# Patient Record
Sex: MALE | HISPANIC OR LATINO | Employment: PART TIME | URBAN - METROPOLITAN AREA
[De-identification: names, ages, dates, MRNs, and addresses within clinical notes are randomized per-mention and may not be internally consistent; named-entity substitution may affect disease eponyms.]

---

## 2019-01-07 ENCOUNTER — HOSPITAL ENCOUNTER (OUTPATIENT)
Dept: BEHAVIORAL HEALTH | Facility: CLINIC | Age: 34
Discharge: HOME | End: 2019-01-07
Attending: SOCIAL WORKER | Admitting: SOCIAL WORKER
Payer: COMMERCIAL

## 2019-01-07 VITALS
WEIGHT: 218 LBS | BODY MASS INDEX: 31.21 KG/M2 | SYSTOLIC BLOOD PRESSURE: 127 MMHG | HEIGHT: 70 IN | DIASTOLIC BLOOD PRESSURE: 77 MMHG | HEART RATE: 79 BPM

## 2019-01-07 PROCEDURE — H0001 ALCOHOL AND/OR DRUG ASSESS: HCPCS

## 2019-01-07 ASSESSMENT — ANXIETY QUESTIONNAIRES
2. NOT BEING ABLE TO STOP OR CONTROL WORRYING: NOT AT ALL
3. WORRYING TOO MUCH ABOUT DIFFERENT THINGS: NOT AT ALL
1. FEELING NERVOUS, ANXIOUS, OR ON EDGE: SEVERAL DAYS
GAD7 TOTAL SCORE: 1
6. BECOMING EASILY ANNOYED OR IRRITABLE: NOT AT ALL
7. FEELING AFRAID AS IF SOMETHING AWFUL MIGHT HAPPEN: NOT AT ALL
4. TROUBLE RELAXING: NOT AT ALL
5. BEING SO RESTLESS THAT IT IS HARD TO SIT STILL: NOT AT ALL

## 2019-01-07 ASSESSMENT — MIFFLIN-ST. JEOR: SCORE: 1945.09

## 2019-01-07 ASSESSMENT — PAIN SCALES - GENERAL: PAINLEVEL: NO PAIN (0)

## 2019-01-07 ASSESSMENT — PATIENT HEALTH QUESTIONNAIRE - PHQ9: SUM OF ALL RESPONSES TO PHQ QUESTIONS 1-9: 0

## 2019-01-07 NOTE — PROGRESS NOTES
"[facility]  [address] [place] [id number]      ADULT CD ASSESSMENT ADDENDUM      Patient Name: Real Manning  Cell Phone: [phone]   Email:  [email or web address]  Emergency Contact: Cintia Waters  (Mom) Tel: [phone]    The patient reported being:  Single, no serious involvement    With which race do you identify? / Black    Initial Screening Questions     1. Are you currently having severe withdrawal symptoms that are putting yourself or others in danger?  No    2. Are you currently having severe medical problems that require immediate attention?  No    3. Are you currently having severe emotional or behavioral problems that are putting yourself or others at risk of harm?  No    4. Do you have sufficient reading skills that will enable you to understand written materials, including the program rules and client rights materials?  Yes     Family History and other additional information     Who raised you? (parents, grandparents, adoptive parents, step-parents, etc.)    Great Aunt    Please tell me what it was like growing up in your family. (please include any history of substance abuse, mental health issues, emotional/physical/sexual abuse, forms of discipline, and support)       \"I had a pretty good childhood. I wasn't with my immediate family. Both my parents had their rights revoked. I was with my great aunt for 13 years.\" He reports his brother was with him as well.     CD: none  MH: none    Do you have any children or Stepchildren? Yes, explain: 2 children    Are you being investigated by Child Protection Services? No    Do you have a child protection worker, probation office or ?  Yes, explain: Mehrdad Grewal at [facility]    How would you describe your current finances?  Some money problems    If you are having problems, (unpaid bills, bankruptcy, IRS problems) please explain:  Yes, explain: student loans    If working or a student are you " "able to function appropriately in that setting? Yes     Describe your preferred learning style:  by hands-on practice and by watching someone else demonstrate    What are your some of your personal strengths?  \"I believe self discipline. My social skills are really strong. I catch onto things quickly. Helping others.\"    Do you currently participate in community enoc activities, such as attending Yazidism, temple, Christianity or Muslim services?  Yes, explain: He just joined a Yazidism.    How does your spirituality impact your recovery?  \"I feel like it brings me closer to success to getting to where I want to be at. It is really influential.\"    Do you currently self-administer your medications?  The patient is not currently taking any prescription or over the counter medications.    Have you ever had to lie to people important to you about how much you ewing?   No   Have you ever felt the need to bet more and more money?   Yes, explain: \"I would keep trying to get myself out of a hole. This happens once every few months.\"    Any big wins? \"a couple of times.\"  Min amount taken to the casino: $100-$200    He answered No to the Brief Biosocial Gambling Screen questions.     Have you ever attempted treatment for a gambling problem?   No   Have you ever touched or fondled someone else inappropriately or forced them to have sex with you against their will?   No   Are you or have you ever been a registered sex offender?   No   Is there any history of sexual abuse in your family? No   Have you ever felt obsessed by your sexual behavior, such as having sex with many partners, masturbating often, using pornography often?   No     Have you ever received therapy or stayed in the hospital for mental health problems?   Yes, explain: therapy when younger. No hospitalizations.     Have you ever hurt yourself, such as cutting, burning or hitting yourself?   No     Have you ever purged, binged or restricted yourself as a way to " control your weight?   No     Are you on a special diet?   No     Do you have any concerns regarding your nutritional status?   No     Have you had any appetite changes in the last 3 months?   No   Have you had weight loss or weight gain of more than 10 lbs in the last 3 months?   If patient gained or lost more than 10 lbs, then refer to program RN / attending Physician for assessment.   Yes, explain: He gained weight.   Was the patient informed of BMI?    Above,  General nutrition education   Yes   Have you engaged in any risk-taking behavior that would put you at risk for exposure to blood-borne or sexually transmitted diseases?   No   Do you have any dental problems?   No   Have you ever lived through any trauma or stressful life events?   No   In the past month, have you had any of the following symptoms related to the trauma listed above? (dreams, intense memories, flashbacks, physical reactions, etc.)   No   Have you ever believed people were spying on you, or that someone was plotting against you or trying to hurt you?   No   Have you ever believed someone was reading your mind or could hear your thoughts or that you could actually read someone's mind or hear what another person was thinking?   No   Have you ever believed that someone of some force outside of yourself was putting thoughts into your mind or made you act in a way that was not your usual self?  Have you ever though you were possessed?   No   Have you ever believed you were being sent special messages through the TV, radio or newspaper?   No   Have you ever heard things other people couldn't hear, such as voices or other noises?   No   Have you ever had visions when you were awake?  Or have you ever seen things other people couldn't see?   No   Do you have a valid 's license?    No, explain: He has never had a license before.     PHQ-9, ERIC-7 and Suicide Risk Assessment   PHQ-9 on 1/7/2019 ERIC-7 on 1/7/2019   The patient's PHQ-9 score was 0  out of 27, indicating no depression.   The patient's ERIC-7 score was 1 out of 21, indicating minimal anxiety.       Sargent-Suicide Severity Rating Scale   Suicide Ideation   1.) Have you ever wished you were dead or that you could go to sleep and not wake up?     Lifetime:  No   Past Month:  No     2.) Have you actually had any thoughts of killing yourself?   Lifetime:  Yes   Past Month:  No     3.) Have you been thinking about how you might do this?     Lifetime:  Yes, Describe: 2017.    Past Month:  No     4.) Have you had these thoughts and had some intention of acting on them?     Lifetime:  No   Past Month:  No     5.) Have you started to work out the details of how to kill yourself?   Lifetime:  No   Past Month:  No     6.) Do you intend to carry out this plan?      Lifetime:  No   Past Month:  No     Intensity of Ideation   Intensity of ideation (1 being least severe, 5 being most severe):     Lifetime:  1   Past Month:  The patient denied having any suicidal thoughts within the past month.     How often do you have these thoughts?  The patient denied having any suicidal thoughts within the past month.     When you have the thoughts how long do they last?  The patient denied having any suicidal thoughts within the past month.     Can you stop thinking about killing yourself or wanting to die if you want to?  The patient denied having any suicidal thoughts within the past month.     Are there things - anyone or anything (i.e. family, Orthodox, pain of death) that stopped you from wanting to die or acting on thoughts of suicide?  Protective factors definitely stopped you from attempting suicide     What sort of reasons did you have for thinking about wanting to die or killing yourself (ie end pain, stop how you were feeling, get attention or reaction, revenge)?  Does not apply     Suicidal Behavior   (Suicide Attempt) - Have you made a suicide attempt?     Lifetime:  The patient had never made a suicide  attempt.   Past Month:  The patient had never made a suicide attempt.     Have you engaged in self-harm (non-suicidal self-injury)?  The patient denied having any history of engaging in self-harm (non-suicidal self-injury).     (Interrupted Attempt) - Has there been a time when you started to do something to end your life but someone or something stopped you before you actually did anything?  No     (Aborted or Self-Interrupted Attempt) - Has there been a time when you started to do something to try to end your life but you stopped yourself before you actually did anything?  No     (Preparatory Acts of Behavior) - Have you taken any steps towards making suicide attempt or preparing to kill yourself (such as collecting pills, getting a gun, giving valuables away or writing a suicide note)?  No     Actual Lethality/Medical Damage:  The patient denied ever making a suicidal attempt.       2008  [employer] [facility]  Used with permission by Rosita Jung, PhD.       Guide to C-SSRS Risk Ratings   NO IDEATION:  with no active thoughts IDEATION: with a wish to die. IDEATION: with active thoughts. Risk Ratings   If Yes No No 0 - Very Low Risk   If NA Yes No 1 - Low Risk   If NA Yes Yes 2 - Low/moderate risk   IDEATION: associated thoughts of methods without intent or plan INTENT: Intent to follow through on suicide PLAN: Plan to follow through on suicide Risk Ratings cont...   If Yes No No 3 - Moderate Risk   If Yes Yes No 4 - High Risk   If Yes Yes Yes 5 - High Risk   The patient's ADDITIONAL RISK FACTORS and lack of PROTECTIVE FACTORS may increase their overall suicide risk ratings.     Additional Risk Factors:    Significant legal problems including being at risk of incarceration   Protective Factors:    Having people in his/her life that would prevent the patient from considering a suicide attempt (i.e. young children, spouse, parents, etc.)     An absence of mental health issues or stable  "and well treated mental health issues     An absence of chronic health problems or stable and well treated chronic health issues     Having easy access to supportive family members     Having a good community support network     Having cultural, Restorationist or spiritual beliefs that discourage suicide     Having restricted access to highly lethal means of suicide     Risk Status   Past month:0. - Very Low Risk:  Evaluation Counselors:  Document in Epic / SBAR to counselor \"Very Low Risk\".      Treatment Counselors:  Reassess upon admission as applicable, assess weekly in progress notes under Dimension 3 and summarize in Discharge / Treatment summary under Dimension 3.    Past 24 hours:0. - Very Low Risk:  Evaluation Counselors:  Document in Epic / SBAR to counselor \"Very Low Risk\".      Treatment Counselors:  Reassess upon admission as applicable, assess weekly in progress notes under Dimension 3 and summarize in Discharge / Treatment summary under Dimension 3.   Additional information to support suicide risk rating: There was no additional information to provide at this time.  Please see the above suicide risk rating information.     Mental Health Status   Physical Appearance/Attire: Appears stated age   Hygiene: well groomed   Eye Contact: at examiner   Speech Rate:  regular   Speech Volume: regular   Speech Quality: fluid   Cognitive/Perceptual:  reality based   Cognition: memory intact    Judgment: intact   Insight: intact   Orientation:  time, place, person and situation   Thought: logical    Hallucinations:  none   General Behavioral Tone: cooperative   Psychomotor Activity: no problem noted   Gait:  no problem   Mood: normal and appropriate   Affect: congruence/appropriate   Counselor Notes: NA     Criteria for Diagnosis: DSM-5 Criteria for Substance Use Disorders      Alcohol Use Disorder Moderate - 303.90 (F10.20)    Level of Care   I.) Intoxication and Withdrawal: 0   II.) Biomedical:  0   III.) Emotional " and Behavioral:  1   IV.) Readiness to Change:  2   V.) Relapse Potential: 2   VI.) Recovery Environmental: 2     Initial Problem List     The patient lacks relapse prevention skills  The patient has poor coping skills  The patient has poor refusal skills   The patient lacks a sober peer support network  The patient has a tendency to isolate  The patient has current legal issues    Patient/Client is willing to follow treatment recommendations.  Yes    Counselor: Talita Finn [facility]      Vulnerable Adult Checklist for OUTPATIENTS     1.  Do you have a physical, emotional or mental infirmity or dysfunction?       No    2.  Does this issue impair your ability to provide for your own care without help, including providing yourself with food, shelter, clothing, healthcare or supervision?       No    3.  Because of this issue, I need assistance to protect myself from maltreatment by others.      No    Based on the above information:    This person is not a functional Vulnerable Adult according to [place] Statute 626.5572 subdivision 21.

## 2019-01-07 NOTE — PROGRESS NOTES
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 prone he or she is to relapse? What coping skills does the person have that decrease relapse potential?)      Pt denies having ever attended a cd treatment program, a detox program, or a 12 step meeting before. Pt has never tried to stop drinking until May or 2018. Since then he reports drinking about once a month, but is unable to completely abstain from alcohol and non-prescribed opiates. In the past 30 days, pt rates his cravings for alcohol as a 0 on the 0-10 Craving Scale.  Pt has poor impulse control when it comes to not drinking.  Pt displays moderate vulnerability for further substance use.         DIMENSION VI - Recovery Environment   1. Are you employed/attending school? Tell me about that.     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 He reports he takes care of his 6 year old daughter full time. 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    2A. Describe a typical day; evening for you. Work, school, social, leisure, volunteer, spiritual practices. Include time spent obtaining, using, recovering from drugs or alcohol. (DSM)     Sober day: \"wake up taking my daughter to school, going to work. 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    Please describe what leisure activities have been associated with your substance abuse:     \"at a bar, cause some of the places have a bar.\" He would order juice or water if he was performing at the place.     2B. How often do you spend more time than you planned using or use more than you planned? (DSM)     \"very minimal now.\"  He was more likely to drink more than planned prior to probation.    3. How important is using to your social connections? Do many of your family or friends use?     He doesn't hang out with a lot of people.    4A. Are you currently in a significant relationship?     No    4C. Sexual Orientation:     Heterosexual    5A. Who do you live with?      His mom and 6 year old daughter.    5B. Tell me about their alcohol/drug use and mental health issues.     No use    5C. Are you concerned for your safety there? No    5D. Are you concerned about the safety of anyone else who lives with you? No    6A. Do you have children who live with you?     Yes, 6 year old daughter. No CPS involvement.     6B. Do you have children who do not live with you?     Yes, a  baby    7A. Who supports you in making changes in your alcohol or drug use? What are they willing to do to support you? Who is upset or angry about you making changes in your alcohol or drug use? How big a problem is this for you?      \"everyone.  My mentor is very supportive of me. He would even come visit me in the workhouse.\" They see each other weekly.  \"He is the member of the [employer] I joined.\"    7B. This table is provided to record information about the person s relationships and available support It is not necessary to ask each item; only to get a comprehensive picture of their support system.  How often can you count on the following people when you need someone?   Partner / Spouse The patient does not " have a current partner or spouse.   Parent(s)/Aunt(s)/Uncle(s)/Grandparents Always supportive   Sibling(s)/Cousin(s) Usually supportive   Child(mary) Always supportive   Other relative(s) Always supportive   Friend(s)/neighbor(s) Always supportive   Child(mary) s father(s)/mother(s) Always supportive   Support group member(s) The patient denied having any current involvement with 12-step or other support group meetings.   Community of enoc members Always supportive   /counselor/therapist/healer The patient denied having any current involvement with a , counselor, therapist or healer.   Other (specify) No     8A. What is your current living situation?     He currently lives with his mom and daughter.    8B. What is your long term plan for where you will be living?    He eventually wants his own place.    8C. Tell me about your living environment/neighborhood? Ask enough follow up questions to determine safety, criminal activity, availability of alcohol and drugs, supportive or antagonistic to the person making changes.      No concerns.    9. Criminal justice history: Gather current/recent history and any significant history related to substance use--Arrests? Convictions? Circumstances? Alcohol or drug involvement? Sentences? Still on probation or parole? Expectations of the court? Current court order? Any sex offenses - lifetime? What level? (DSM)    Felony assault charge from April/May 2018  [facility]ation in [facility]  Collateral reports: pt has a DWI in 2009 and a DWI in 2012.  In about 2015, he has a prior domestic and a violation of a DANCO.    10. What obstacles exist to participating in treatment? (Time off work, childcare, funding, transportation, pending intermediate time, living situation)     The patient denied having any obstacles for participating in substance abuse treatment.    Dimension VI Ratings   Recovery environment - The placing authority must use the criteria in Dimension VI  to determine a client s recovery environment.   RISK DESCRIPTIONS - Severity ratin Client is engaged in structured, meaningful activity, but peers, family, significant other, and living environment are unsupportive, or there is criminal justice involvement by the client or among the client's peers, significant others, or in the client's living environment.    REASONS SEVERITY WAS ASSIGNED - (What support does the person have for making changes? What structure/stability does the person have in his or her daily life that will increase the likelihood that changes can be sustained? What problems exist in the person s environment that will jeopardize getting/staying clean and sober?)     The patient currently lives with his mother and his 6 year old daughter.  The patient denied having any concerns regarding his immediate living environment or neighborhood.  The patient denied having a relationship conflict with anyone due to his ongoing substance abuse issues. The patient identified as being heterosexual and he denied being in a romantic relationship at this time. The patient reported having a history of legal issues, including a felony assault from 2018. He is on probation in [facility]. The patient is unemployed and he last worked  when his contract ended. As a result, the patient reported having some increased financial stress.  The patient reports most people he is around are sober.         Client Choice/Exceptions   Would you like services specific to language, age, gender, culture, Baptist preference, race, ethnicity, sexual orientation or disability?  No    What particular treatment choices and options would you like to have? None    Do you have a preference for a particular treatment program? None    Criteria for Diagnosis     Criteria for Diagnosis  DSM-5 Criteria for Substance Use Disorder  Instructions: Determine whether the client currently meets the criteria for Substance Use Disorder  using the diagnostic criteria in the DSM-V pp.481-589. Current means during the most recent 12 months outside a facility that controls access to substances    Category of Substance Severity (ICD-10 Code / DSM 5 Code)     Alcohol Use Disorder Moderate  (F10.20) (303.90)   Cannabis Use Disorder The patient does not meet the criteria for a Cannabis use disorder.   Hallucinogen Use Disorder The patient does not meet the criteria for a Hallucinogen use disorder.   Inhalant Use Disorder The patient does not meet the criteria for an Inhalant use disorder.   Opioid Use Disorder The patient does not meet the criteria for an Opioid use disorder.   Sedative, Hypnotic, or Anxiolytic Use Disorder The patient does not meet the criteria for a Sedative/Hypnotic use disorder.   Stimulant Related Disorder The patient does not meet the criteria for a Stimulant use disorder.   Tobacco Use Disorder The patient does not meet the criteria for a Tobacco use disorder.   Other (or unknown) Substance Use Disorder The patient does not meet the criteria for a Other (or unknown) Substance use disorder.       Collateral Contact Summary   Number of contacts made: 3    Contact with referring person:  No    If court related records were reviewed, summarize here: No court records had been reviewed at the time of this documentation.    Information from collateral contacts supported/largely agreed with information from the client and associated risk ratings.      Rule 25 Assessment Summary and Plan   's Recommendation    1)  Complete an Intensive Outpatient CD Treatment Program, such as [facility], [place].  2)  Abstain from all mood-altering chemicals unless prescribed by a licensed provider.   3)  Attend, at minimum, 2 weekly support group meetings, such as 12 step based (AA/NA), SMART Recovery, Celebrate Recovery, Health Realizations, and/or Refuge Recovery meetings.     4)  Actively work with your male mentor on a weekly basis.    5)  Follow all the recommendations of your treatment/medical providers.  6)  Remain law abiding and follow all recommendations of the Courts/PO.      Collateral Contacts     Name:    Mehrdad Grewal   Relationship:    Thomas العراقي PO   Phone Number:    [phone] fax Releases:    Yes     Barron stated he has been working with pt for a few months. Pt's felony assault occurred in April/May 2018 and he was convicted on 8/2/2018. Pt did serve some time in the workhouse. According to the [employer], pt began using substances between 15 and 17 years old. He used marijuana twice a month until 2017. He has 2 DWIs from 2009 and 2012 as well as a prior domestic assault charge and violating a DANCO around 2015. Pt had 3 positive UAs in November; 1 for alcohol, 1 for opiates, and one was diluted. His 2 UAs in December were negative and his January 3rd UA was negative. Barron reports pt is not willing to do CD tx, but he is willing to do individual psychotherapy. Barron wants pt to address any CD concerns before pt does psychotherapy. When it comes to gambling, the [employer] documented pt stating he gambles once a year. Most recently in 2018 when he won $440 playing black jazmin. Pt plays scratch offs twice a year.      Collateral Contacts     Name:    Cintia Waters   Relationship:    Mom   Phone Number:    [phone]   Releases:    Yes     Cintia stated she is not concerned about pt's chemical use. She stated he has used alcohol and weed, but not in some time. She reports no other drug use. She reports pt is the only one of her children that helps her and takes care of his kids. She confirms he lives with her. She stated he tries to take care of too many things at once.       Collateral Contacts     Name:    [facility]   Relationship:    EMR   Phone Number:    NA   Releases:    NA     The patient's medical record at [facility] was reviewed.  There were no recent prescriptions for opiate pain medications or ED visits recorded by  [facility], [facility], or [facility] in the past year.     ollateral Contacts      A problematic pattern of alcohol/drug use leading to clinically significant impairment or distress, as manifested by at least two of the following, occurring within a 12-month period:    2.) There is a persistent desire or unsuccessful efforts to cut down or control alcohol/drug use  5.) Recurrent alcohol/drug use resulting in a failure to fulfill major role obligations at work, school or home.  6.) Continued alcohol use despite having persistent or recurrent social or interpersonal problems caused or exacerbated by the effects of alcohol/drug.  11.) Withdrawal, as manifested by either of the following: The characteristic withdrawal syndrome for alcohol/drug (refer to Criteria A and B of the criteria set for alcohol/drug withdrawal).      Specify if: In early remission:  After full criteria for alcohol/drug use disorder were previously met, none of the criteria for alcohol/drug use disorder have been met for at least 3 months but for less than 12 months (with the exception that Criterion A4,  Craving or a strong desire or urge to use alcohol/drug  may be met).     In sustained remission:   After full criteria for alcohol use disorder were previously met, none of the criteria for alcohol/drug use disorder have been met at any time during a period of 12 months or longer (with the exception that Criterion A4,  Craving or strong desire or urge to use alcohol/drug  may be met).   Specify if:   This additional specifier is used if the individual is in an environment where access to alcohol is restricted.    Mild: Presence of 2-3 symptoms  Moderate: Presence of 4-5 symptoms  Severe: Presence of 6 or more symptoms

## 2019-01-09 ASSESSMENT — ANXIETY QUESTIONNAIRES: GAD7 TOTAL SCORE: 1

## 2019-01-11 NOTE — PROGRESS NOTES
[facility]  [address] [place] [address]., [place] [address]      1/11/2019      Real Manning  [address]  [place] [address]      Dear Mr. Manning,    It was a pleasure meeting with you on 1/7/2019 for your Chemical Dependency Evaluation. Based on your evaluation, the recommendation is:  1)  Complete an Intensive Outpatient CD Treatment Program, such as [facility], [place].  2)  Abstain from all mood-altering chemicals unless prescribed by a licensed provider.   3)  Attend, at minimum, 2 weekly support group meetings, such as 12 step based (AA/NA), SMART Recovery, Celebrate Recovery, Health Realizations, and/or Refuge Recovery meetings.     4)  Actively work with your male mentor on a weekly basis.   5)  Follow all the recommendations of your treatment/medical providers.  6)  Remain law abiding and follow all recommendations of the Courts/PO.    If I can be of further service, please don't hesitate to call.    Sincerely,        Talita Clark MA [facility]  CD Evaluation Counselor  [phone]    (emailed to pt on 1/11/2019)

## 2019-01-11 NOTE — PROGRESS NOTES
"Visit Date:   01/07/2019      CHEMICAL DEPENDENCY ASSESSMENT      DATE OF EVALUATION:  01/07/2019   EVALUATION COUNSELOR:  Talita Clark MA, YUDELKA.   PATIENT'S ADDRESS:   [address], [address], [place] [address]   TELEPHONE:  [phone]   STATISTICS:  Date of Birth 1985.  Age 33.  Gender: Male.  Marital Status:  Single.   [facility] [place]   REFERRAL SOURCE:  [facility].      REASON FOR EVALUATION:  Mr. Real Manning presented to [facility] for an outpatient evaluation for possible chemical dependency.  The reason for the CD evaluation was due to the patient \"I had 3 different positive UAs.  According to the orders by the  if I have reoccurring positives they want me to follow up with a chemical dependency evaluation.     1 (UA) I drink too much water.  Positive UA for dilution, 1 for opiates.  I smashed my finger real bad and the other one came in, positive for alcohol after the holiday.\"  He has no use condition for probation.      HEALTH HISTORY AND MEDICATIONS:  Patient denies.      HISTORY OF PREVIOUS TREATMENT AND COUNSELING:  The patient denies.      HISTORY OF ALCOHOL AND DRUG USE:    ALCOHOL:  Age of first use 17, first use was late high school chata year, he was drinking on the weekends.  Heaviest use was during high school years and then another heavy use period in 2010 when he was drinking every other day because \"the person I was with always had something in the refrigerator.\"  He would drink anything wine, beer, hard alcohol, etc.  Prior to 2018 he would drink a half pint or couple airplane bottles and a couple of beers in a day.  In 2018.  He reports drinking once a month or less, typically 1-2 drinks.  In the past 3 days he reports he drank twice, typically 1 drink.  He reports he does not buy alcohol if it is not around.  He drinks if he goes to someone's house.  Last use a couple nights ago.        MARIJUANA:  Age of " first use 17, heaviest use off and on until a few years ago.  In 2018  he reports he used once before probation.  Last used 06/18/2018.        OTHER OPIATES, SYNTHETICS:  Percocet.  He was first prescribed when he went to the ED for his finger and got a prescription for his finger a few weeks ago.  He denies abuse.  The patient appears either unable to or unwilling to provide an accurate opiate use history.  The patient's UA was positive for OXY today and he denies using in the past couple of days last week.  According to patient, 2 weeks ago.      SUMMARY OF CHEMICAL DEPENDENCY SYMPTOMS ACKNOWLEDGED BY THE PATIENT:  The patient identifies with 11 of 11 DSM-V criteria for a diagnostic impression of substance use disorder.      SUMMARY OF COLLATERAL DATA:   1.  The patient's , Mehrdad Morales  Barron states he has been working with the patient for a few months.  The patient's felony assault occurred in April or 05/2018 and he was convicted on 08/02/2018.  The patient did serve some time in the workhouse.  According to PSI the patient began using substances between 15 and 17 years old.  He used marijuana twice until 2017.  He has 2 DWIs from 2009 and 2012, as well as a prior domestic assault charge and violation of that information, around 2015, the patient had 3 positive UAs in November, 1 for alcohol, 1 for opiates and 1 was diluted.  His 2 UAs in December were negative and his 01/03 UA was negative.  Barron reports the patient is not willing to do CD treatment, but he is willing to do individual psychotherapy.  Barron wants the patient to address any CD concerns before patient does psychotherapy.  When it comes to family, the patient's PSI documented the patient's family stated he gambles once a year, most recently in 2018 when he won $440 buying [email or web address].  The patient plays scratch offs twice a year.      2.  The patient's mom, Cintia Jt.  Cintia states she is not concerned about the patient's  chemical use.  She stated he has used alcohol and weed, but not in some time.  She reports no other drug use.  She reports the patient is the only one of her children that helps her and takes care of his kids.  She confirms he lives with her.  She states he tries to take care of too many things at once.   3.  [facility] records.  The patient's medical records at [facility] was reviewed.  There were no recent prescriptions for opiate pain medications or ED visits recorded by [facility] or [facility] in the past year.      VULNERABLE ADULT ASSESSMENT:  This purse is not a functional vulnerable adult according to mental statured 626.557, subdivision 21.      IMPRESSION:  Alcohol use disorder, moderate, F10.20.      ASAM PLACEMENT CRITERIA:   DIMENSION 1:  Acute Intoxication and Withdrawal Potential:  Risk level 0.  The patient reports his last use of opiates was a couple weeks ago and his last use of alcohol was a couple days ago.  The patient displays no intoxication or withdrawal symptomatology at this time.  The patient denies any feelings of withdrawal.  The patient was given a breathalyzer during his evaluation. The patient's blood alcohol content was 0.  He was also given a UA during the evaluation.  UA was positive for opiate substances tested.  The patient denies using opiates in the past couple of days.      DIMENSION 2:  Biomedical Conditions and Complications:  Risk level 0.  The patient denies having any chronic biomedical conditions that interfere of treatment or any other recovery skills training workshop.  The patient reports having flat feet and allergies when the weather changes and to dust.  He denies taking any medications.  The patient does not have a primary care doctor.  At the time of this evaluation, the patient's blood pressure is 127/77 and his pulse was 79 beats per minute.  The patient denies any pain at this time.  Reports his pain level to be a 0  on the 0-10 pain rating scale.  The patient denies having any consumption of nicotine products at this time.        DIMENSION 3/EMOTIONAL, BEHAVIORAL CONDITIONS:  Risk level 1.  The patient reports having historical mental diagnosed ADHD as a child.  The patient is not taking medications at this time.  He denies a history of abuse.  At the time of the assessment, the patient's PHQ-9 score was 0, no depression.  His ERIC-7 score was 1, minimal anxiety.  The patient appears to have some emotional and stress management skills.  The patient denies any self-injury behavior, suicide attempts or suicidal thoughts at this time.  During the patient's assessment his Sully Suicide Severity rating Scale Score is 0, very low risk.        DIMENSION 4:  Readiness for Change:  Risk level 2.  The patient does not believe he really has a problem with alcohol.  He reports he has been able to stop smoking cigarettes and marijuana cold turkey and believes he can do the same with alcohol.  He has reduced his alcohol consumption to about once a month since May or June 2018.  During this evaluation, the patient was given a UA and the UA was prepped tested positive for oxy.  The patient denies using any opiates in the past couple of days.  He appears to be either unable or unwilling to provide an accurate use history when it comes to using opiate pain medications.  The patient appears to be in the preparation stage when it comes within the stage of change model and comes in using alcohol, marijuana, but in the precontemplation stage comes to his opiate use.      DIMENSION 5:  Relapse and Continued Use Potential:  Risk level 2.  The patient denies having ever attended CD treatment program, detoxification program or a 12-step meeting before.  He has never tried to stop drinking until May or 06/2018.  Since then, he reports drinking about once a month, but is unable to completely abstain from alcohol and non-prescribed opiates.  In the past  3 days, the patient rates his craving for alcohol as a 0 on 0-10 rating scale.  The patient has poor impulse control when it comes to not drinking.  He displays moderate vulnerability for further substance use.      DIMENSION 6:  Recovery Environment:  Risk level 2.  The patient currently lives with his mother and a 6-year-old daughter.  The patient denied having any concerns regarding his immediate living environment or neighborhood.  The patient denied having a relationship conflict with anyone due to his ongoing substance abuse issues.  The patient identifies as being heterosexual and denied being in a romantic relationship at this time.  The patient reported having history of legal issues including a felony assault from 2018.  He is on probation and has been fine.  The patient is unemployed.  He last worked until 12/29 when his contract ended.  As a result, the patient reported having some increased financial stress.  The patient reports most of the people he is around are sober.      RECOMMENDATIONS:   1.  Complete intensive outpatient CD treatment program such as [facility].     2.  Abstain from all mood-altering chemicals unless prescribed by licensed provider.   3.  Attend at minimum 2 weekly support group meetings such as 12-step base.     4.  Actively work with your male mentor on a weekly basis.   5.  Follow recommendations of your treatment and medical providers.   6.  Remain law abiding, follow recommendations of courts and probation.      INITIAL PROBLEM LIST:   1.  The patient lacked relapse prevention skills.   2.  The patient has poor coping skills.   3.  The patient has poor refusal skills.   4.  The patient lacks a sober peer support network.         This information has been disclosed to you from records protected by Federal confidentiality rules (42 CFR part 2). The Federal rules prohibit you from making any further disclosure of this information unless further disclosure is  expressly permitted by the written consent of the person to whom it pertains or as otherwise permitted by 42 CFR part 2. A general authorization for the release of medical or other information is NOT sufficient for this purpose. The Federal rules restrict any use of the information to criminally investigate or prosecute any alcohol or drug abuse patient.      TIFF JAY CD             D: 2019   T: 2019   MT: ROBERTO      Name:     MAGUE FIERRO   MRN:      [id number]        Account:      [id number]   :      1985           Visit Date:   2019      Document: [id number]

## 2024-01-30 ENCOUNTER — TELEPHONE (OUTPATIENT)
Dept: FAMILY MEDICINE | Facility: CLINIC | Age: 39
End: 2024-01-30

## 2024-01-30 ENCOUNTER — OFFICE VISIT (OUTPATIENT)
Dept: FAMILY MEDICINE | Facility: CLINIC | Age: 39
End: 2024-01-30
Payer: COMMERCIAL

## 2024-01-30 VITALS
RESPIRATION RATE: 22 BRPM | HEART RATE: 72 BPM | HEIGHT: 69 IN | SYSTOLIC BLOOD PRESSURE: 129 MMHG | WEIGHT: 217 LBS | TEMPERATURE: 97.5 F | OXYGEN SATURATION: 98 % | BODY MASS INDEX: 32.14 KG/M2 | DIASTOLIC BLOOD PRESSURE: 82 MMHG

## 2024-01-30 DIAGNOSIS — S66.921S: Primary | ICD-10-CM

## 2024-01-30 DIAGNOSIS — S61.411S: Primary | ICD-10-CM

## 2024-01-30 DIAGNOSIS — G89.29 CHRONIC PAIN OF RIGHT HAND: ICD-10-CM

## 2024-01-30 DIAGNOSIS — M79.641 CHRONIC PAIN OF RIGHT HAND: ICD-10-CM

## 2024-01-30 PROCEDURE — 99213 OFFICE O/P EST LOW 20 MIN: CPT | Mod: GC

## 2024-01-30 NOTE — PROGRESS NOTES
"  Assessment & Plan     Hand laceration involving tendon, right, sequela  Chronic pain of right hand  Patient is status postop right hand surgery.  Date of surgery 11/23/2023.  Patient is status post right wrist laceration repair of numerous flexor tendons, median nerve, and ulnar artery.  Patient has reported chronic pain since the surgery and has been utilizing Neurontin as well as oxycodone for acute pain.  At patient's last visit with Dr. Christian cosmetic/plastic surgeon, patient was provided with 1 last refill of oxycodone 5 mg tablets dispense 15.  Patient was counseled at his previous appointment to set up a visit with [facility].  Patient has an appointment set up at the end of February 2024 with a pain clinic for further discussion of chronic opioids and multimodal pain management.  I discussed with patient's that given he has a history of opioids prescribed in the past in conjunction with an ED visit on 7/25/2019 concerning for substance abuse/intoxication, I stated that in line with our clinic policy I am unable to prescribe opioids at today's visit.  I recommended that he follow-up with the pain clinic with which he already has a scheduled appointment.  I discussed with Real that I was willing to talk about alternative options such as increasing gabapentin dosage or other adjuvant medications/non-medication modalities which may be able to help alleviate some of his pain.  When I stated I would not be prescribing opioids at today's visit the patient and his mother abruptly left.  I feel the patient would be most benefited by a pain management clinic to multimodally evaluate and manage his complex pain needs.      BMI  Estimated body mass index is 31.95 kg/m  as calculated from the following:    Height as of this encounter: 1.755 m (5' 9.1\").    Weight as of this encounter: 98.4 kg (217 lb).       No follow-ups on file.    Subjective   Real is a 38 year old, presenting for the following " "health issues:  Musculoskeletal Problem (Right hand painful, surgery recently.  Going to therapy is painful)        1/30/2024     4:42 PM   Additional Questions   Roomed by camilo   Accompanied by mom     HPI   Patient presents today for discussion of pain management status post laceration repair of local flexor tendons on his right extremity, median nerve, and ulnar artery.  Patient states that Neurontin and oxycodone have been helpful with pain.  He states that hand therapy is difficult secondary to pain.  He is trying his best to achieve as much mobility as he can, however pain has been a significant issue for him.  He requested that additional oxycodone as he feels this is best for managing his pain.  He denies headache, fever, chills, nausea, vomiting, shortness of breath, chest pain.  His primary concern is centered around pain.  Gabapentin is somewhat helpful at relieving the neuropathic pain he has been experiencing.  He reports no additional concerns at this time     Review of Systems  Constitutional, HEENT, cardiovascular, pulmonary, gi and gu systems are negative, except as otherwise noted.  -ROS reviewed, see HPI for pertinent positives and negatives.  Jerry Bustillo,           Objective    /82   Pulse 72   Temp 97.5  F (36.4  C) (Oral)   Resp 22   Ht 1.755 m (5' 9.1\")   Wt 98.4 kg (217 lb)   SpO2 98%   BMI 31.95 kg/m    Body mass index is 31.95 kg/m .  Physical Exam  Constitutional:       Appearance: Normal appearance.   HENT:      Head: Normocephalic and atraumatic.      Right Ear: External ear normal.      Left Ear: External ear normal.      Nose: Nose normal.   Abdominal:      General: Abdomen is flat.   Skin:     Comments: -Right upper extremity with significant  well-healed scarring present at the wrist.  Limited mobility of right upper extremity wrist and fingers.  Numbness and decreased sensation at the first second and third digits of right hand.   Neurological:      General: No " focal deficit present.      Mental Status: He is alert and oriented to person, place, and time.   Psychiatric:         Mood and Affect: Mood normal.         Behavior: Behavior normal.        Precepted with Dr. Kenneth Ragsdale MD        Signed Electronically by: Jerry Bustillo DO

## 2024-01-30 NOTE — COMMUNITY RESOURCES LIST (ENGLISH)
01/30/2024   [facility] - Outpatient Clinics  N/A  For additional resource needs, please contact your health insurance member services or your primary care team.  Phone: [phone]   Email: N/A   Address: [address] [place] [address]   Hours: N/A        Financial Stability       Rent and mortgage payment assistance  1  [facility] Out[facility] Distance: 0.81 miles      In-Person, Phone/Virtual   [address] [place] [address]  Language: English, Occitan  Hours: Mon - Fri 8:00 AM - 4:30 PM  Fees: Insurance   Phone: (369) 847-3683 Email: roots@[facility] Website: https://[facility]/roots/     2  [facility] Finance [employer] - [address]11 Supportive Housing Project-Based Rental Assistance Program (811 PRA) Distance: 2.14 miles      Phone/Virtual   [address] [place] [address]  Language: English  Hours: Mon - Fri 8:00 AM - 5:00 PM Appt. Only  Fees: Free   Phone: (860) 716-7119 Email: [place].NetWitness@[facility]. Website: https://www.mnNetWitness.gov/index.html     Utility payment assistance  3  [place] [employer] Department - Energy and Utilities Distance: 2.21 miles      In-Person, Phone/Virtual   [address] [place] [address]  Language: English  Hours: Mon - Fri 8:30 AM - 4:30 PM  Fees: Free   Phone: (808) 745-5555 Website: https://[place].IndigoBoom/Moda2Ridee/energy/consumer-assistance/energy-assistance-program/     4  [place] Public [employer] - [place]'s Telephone Assistance Plan (TAP) and Federal Lifeline and Affordable Connectivity Program (ACP) Distance: 8.48 miles      Phone/Virtual   [address] [place] [address]  Language: English  Fees: Free   Phone: (537) 449-2754 Email: consumer.puc@[facility]. Website: https://[place].gov/puc/consumers/telephone/          Food and Nutrition       Food pantry  5  [employer] - Emergency Food Shelf Distance: 0.92 miles      [place] [address]  Language: English, Vietnamese  Hours: Mon 9:30 AM - 11:30 AM Appt. Only  Fees: Free   Phone: (609) 204-6632 Email: dory@[employer] Website: http://www.[employer].org/     6  Shannan ALINA [facility], [facility]. Distance: 0.93 miles      Delivery, Pickup   [address] [place] [address]  Language: English, Vietnamese  Hours: Mon 9:00 AM - 6:00 PM Appt. Only, Tue - Fri 9:00 AM - 5:00 PM Appt. Only  Fees: Free   Phone: (484) 988-3796 Email: info@[employer] Website: http://www.[employer]ango.org/site     SNAP application assistance  7  [employer] [place] Distance: 1.56 miles      Phone/Virtual   [address] [facility] [address] [place] [address]  Language: English, Hmong, Bahraini, Dutch, Vietnamese  Hours: Mon - Fri 8:30 AM - 4:30 PM  Fees: Free   Phone: (865) 284-5275 Email: helpline@[employer].org Website: https://www.hungersolutions.org/programs/[place]-food-helpline/     8  [employer] ([facility]) [facility] Distance: 0.9 miles      Phone/Virtual   [address] [facility] [address] [place] [address]  Language: English  Hours: Mon - Fri 8:00 AM - 4:30 PM  Fees: Free   Phone: (549) 736-3302 Email: info@caprw.org Website: http://www.caprw.org/     Soup kitchen or free meals  9  [facility] [place] - [facility] - Free Summer Meals Distance: 0.82 miles      In-Person   [address] [facility] [place] [address]  Language: English  Hours: Mon - Fri 12:00 PM - 1:00 PM , Mon - Fri 3:00 PM - 4:00 PM  Fees: Free   Phone: (224) 219-7429 Email: eric@.[facility]. Website: https://www.\[facility]\"".[facility]/departments/benz-recreation/qquzbq-abpddvdsi-zcfyuc     10  [employer] Distance: 1.6 miles      [address] [address]  Language: English  Hours: Mon 12:00 PM - 2:00 PM , Wed 12:00 PM - 2:00 PM  Fees: Free   Phone: (609) 332-3340 Ext.4 Email: info@[employer]aktio Website: http://www.[employer].org           Transportation       Free or low-cost transportation  11  [employer] Distance: 2.99 miles      In-Person   [address] [place] [address]  Language: English, English  Hours: Mon 9:00 AM - 5:00 PM , Tue 9:30 AM - 7:00 PM , Wed 9:00 AM - 5:00 PM , Thu 9:30 AM - 7:00 PM , Fri 9:00 AM - 5:00 PM  Fees: Free   Phone: (141) 477-4766 Email: contactus@[employer] Website: http://www.[employer]k     12  [employer] - [facility] Circulator Bus Distance: 5.24 miles      In-Person   [address] [place] [address]  Language: English  Hours: Tue 9:00 AM - 2:00 PM  Fees: Self Pay   Phone: (455) 763-4014 Email: info@[employer] Website: http://www.Kick[employer].org/     Transportation to medical appointments  13  [employer] - Non-Emergency Medical Transportation Distance: 1.85 miles      In-Person   [address] [place] [address]  Language: English  Hours: Mon - Fri 8:00 AM - 4:00 PM Appt. Only  Fees: Self Pay   Phone: (300) 210-8449 Website: http://www.[employer].org/Medical-Services/Emergency-medical-services/Non-emergency-transportation/     14  [employer] Distance: 3.9 miles      In-Person   [address] [address]  Language: English  Hours: Mon - Thu 6:00 AM - 6:00 PM , Fri 6:00 AM - 5:00 PM  Fees: Insurance, Self Pay   Phone: (861) 212-3863 Email: office@[employer] Website: https://www.U[employer] Here/          Important Numbers & Websites       [address].org  Poison Control   (519) 490-5190 [place]poison.[place]  Suicide and Crisis Lifeline   [address] [facility] Allianceline.org  Childhelp National Child Abuse Hotline   362.573.1478 Childhelphotline.org  National Sexual Assault Hotline   (132) 744-6160 (HOPE) Rainn.org  National Runaway Safeline   (703) 713-6864 (RUNAWAY) [address] [place] [address].org  Pregnancy & Postpartum Support [place]   Call/text 330-726-9777 Ppsupportmn.org  Substance Abuse National Helpline ([facility]   778-194-HELP (0331)  Findtreatment.gov  Emergency Services   91

## 2024-01-30 NOTE — PROGRESS NOTES
Preceptor Attestation:    I discussed the patient with the resident and evaluated the patient in person. I have verified the content of the note, which accurately reflects my assessment of the patient and the plan of care.   Supervising Physician:  Kenneth Ragsdale MD.

## 2024-01-31 ASSESSMENT — ANXIETY QUESTIONNAIRES
3. WORRYING TOO MUCH ABOUT DIFFERENT THINGS: MORE THAN HALF THE DAYS
GAD7 TOTAL SCORE: 10
IF YOU CHECKED OFF ANY PROBLEMS ON THIS QUESTIONNAIRE, HOW DIFFICULT HAVE THESE PROBLEMS MADE IT FOR YOU TO DO YOUR WORK, TAKE CARE OF THINGS AT HOME, OR GET ALONG WITH OTHER PEOPLE: VERY DIFFICULT
GAD7 TOTAL SCORE: 10
6. BECOMING EASILY ANNOYED OR IRRITABLE: SEVERAL DAYS
2. NOT BEING ABLE TO STOP OR CONTROL WORRYING: MORE THAN HALF THE DAYS
7. FEELING AFRAID AS IF SOMETHING AWFUL MIGHT HAPPEN: SEVERAL DAYS
1. FEELING NERVOUS, ANXIOUS, OR ON EDGE: MORE THAN HALF THE DAYS
5. BEING SO RESTLESS THAT IT IS HARD TO SIT STILL: SEVERAL DAYS

## 2024-01-31 ASSESSMENT — PATIENT HEALTH QUESTIONNAIRE - PHQ9
SUM OF ALL RESPONSES TO PHQ QUESTIONS 1-9: 14
5. POOR APPETITE OR OVEREATING: SEVERAL DAYS